# Patient Record
Sex: FEMALE | Race: WHITE | Employment: PART TIME | URBAN - METROPOLITAN AREA
[De-identification: names, ages, dates, MRNs, and addresses within clinical notes are randomized per-mention and may not be internally consistent; named-entity substitution may affect disease eponyms.]

---

## 2023-02-11 ENCOUNTER — HOSPITAL ENCOUNTER (EMERGENCY)
Age: 43
Discharge: HOME | End: 2023-02-11
Payer: COMMERCIAL

## 2023-02-11 VITALS
DIASTOLIC BLOOD PRESSURE: 87 MMHG | TEMPERATURE: 98.2 F | OXYGEN SATURATION: 98 % | SYSTOLIC BLOOD PRESSURE: 113 MMHG | RESPIRATION RATE: 18 BRPM | HEART RATE: 94 BPM

## 2023-02-11 DIAGNOSIS — S81.011A LACERATION OF RIGHT KNEE, INITIAL ENCOUNTER: Primary | ICD-10-CM

## 2023-02-11 PROCEDURE — 12002 RPR S/N/AX/GEN/TRNK2.6-7.5CM: CPT

## 2023-02-11 PROCEDURE — 99283 EMERGENCY DEPT VISIT LOW MDM: CPT

## 2023-02-11 RX ORDER — CEPHALEXIN 500 MG/1
500 CAPSULE ORAL 3 TIMES DAILY
Qty: 15 CAPSULE | Refills: 0 | Status: SHIPPED | OUTPATIENT
Start: 2023-02-11 | End: 2023-02-16

## 2023-02-11 ASSESSMENT — PAIN SCALES - GENERAL: PAINLEVEL_OUTOF10: 8

## 2023-02-11 ASSESSMENT — PAIN - FUNCTIONAL ASSESSMENT: PAIN_FUNCTIONAL_ASSESSMENT: 0-10

## 2023-02-11 ASSESSMENT — PAIN DESCRIPTION - ORIENTATION: ORIENTATION: RIGHT

## 2023-02-11 ASSESSMENT — PAIN DESCRIPTION - LOCATION: LOCATION: KNEE

## 2023-02-11 NOTE — ED PROVIDER NOTES
[address] [facility]  ED  EMERGENCY DEPARTMENT ENCOUNTER        Pt Name: Madonna Donaldson  MRN: [id number]  [place] 1980  Date of evaluation: 2/11/2023  Provider: Shell Carvajal PA-C  PCP: Oleg Lucas MD  Note Started: 4:12 PM EST 2/11/23      VIVIANE. I have evaluated this patient. My supervising physician was available for consultation. CHIEF COMPLAINT       Chief Complaint   Patient presents with    Laceration     Pt was running and fell, laceration to right knee bleeding controlled. HISTORY OF PRESENT ILLNESS: 1 or more Elements     History From: Patient and     Limitations to history : None    Madonna Donaldson is a 36 y.o. female who presents to the emergency department for repair of laceration anterior aspect over the superior patella area right knee. Patient states about 3 PM this afternoon wearing gym shoes attempting to sprint against daughter on the driveway. Patient lost footing and fell onto the concrete causing the injury to the anterior aspect of the right knee. Is a transverse laceration which currently measures 3.5 cm. Tetanus shot October, 2022. Nursing Notes were all reviewed and agreed with or any disagreements were addressed in the HPI. REVIEW OF SYSTEMS :      Review of Systems    Positives and Pertinent negatives as per HPI. SURGICAL HISTORY   No past surgical history on file. CURRENTMEDICATIONS       Previous Medications    No medications on file       ALLERGIES     Patient has no known allergies. FAMILYHISTORY     No family history on file.      SOCIAL HISTORY          SCREENINGS        [place] Coma Scale  Eye Opening: Spontaneous  Best Verbal Response: Oriented  Best Motor Response: Obeys commands  [place] Coma Scale Score: 15                CIWA Assessment  BP: 113/87  Heart Rate: 94           PHYSICAL EXAM  1 or more Elements     ED Triage Vitals [02/11/23 1557]   BP Temp Temp Source Heart Rate Resp SpO2 Height Weight 113/87 98.2 °F (36.8 °C) Oral 94 18 98 % -- --       Physical Exam  Vitals and nursing note reviewed. Constitutional:       Appearance: Normal appearance. She is well-developed and normal weight. HENT:      Head: Normocephalic and atraumatic. Right Ear: External ear normal.      Left Ear: External ear normal.   Eyes:      General: No scleral icterus. Right eye: No discharge. Left eye: No discharge. Conjunctiva/sclera: Conjunctivae normal.   Cardiovascular:      Rate and Rhythm: Normal rate. Pulmonary:      Effort: Pulmonary effort is normal.   Musculoskeletal:         General: Tenderness and signs of injury present. Normal range of motion. Cervical back: Normal range of motion and neck supple. Skin:     General: Skin is warm and dry. Neurological:      General: No focal deficit present. Mental Status: She is alert and oriented to person, place, and time. Mental status is at baseline. Psychiatric:         Mood and Affect: Mood normal.         Behavior: Behavior normal.         Thought Content: Thought content normal.         Judgment: Judgment normal.                 DIAGNOSTIC RESULTS   LABS:    Labs Reviewed - No data to display    When ordered only abnormal lab results are displayed. All other labs were within normal range or not returned as of this dictation. EKG: When ordered, EKG's are interpreted by the Emergency Department Physician in the absence of a cardiologist.  Please see their note for interpretation of EKG. RADIOLOGY:   Non-plain film images such as CT, Ultrasound and MRI are read by the radiologist. Plain radiographic images are visualized and preliminarily interpreted by the ED Provider with the below findings:      Interpretation per the Radiologist below, if available at the time of this note:    No orders to display     No results found. No results found.     PROCEDURES     I did use 1% lidocaine with epinephrine to anesthetize the skin surrounding the laceration. Once anesthesia was noted it was draped in sterile fashion cleansed with chlorhexidine and rinsed and irrigated with saline. Exploration reveals no foreign material or deep structures involved. I did use 2 4-0 Vicryl sutures to close the deep space. I then used 4-0 nylon to close the skin. Patient pleased with the results. Vaseline gauze dressing, dry sterile dressing and an Ace wrap applied over the area. Linear length is 3.5 cm. Procedures    CRITICAL CARE TIME (.cctime)       PAST MEDICAL HISTORY      has no past medical history on file. EMERGENCY DEPARTMENT COURSE and DIFFERENTIAL DIAGNOSIS/MDM:   Vitals:    Vitals:    02/11/23 1557   BP: 113/87   Pulse: 94   Resp: 18   Temp: 98.2 °F (36.8 °C)   TempSrc: Oral   SpO2: 98%       Patient was given the following medications:  Medications - No data to display          Is this patient to be included in the SEP-1 Core Measure due to severe sepsis or septic shock? No   Exclusion criteria - the patient is NOT to be included for SEP-1 Core Measure due to: No signs of sepsis. Chronic Conditions affecting care: None   has no past medical history on file. CONSULTS: (Who and What was discussed)  None    Social Determinants Significantly Affecting Health : None    Records Reviewed (External and Source) none    CC/HPI Summary, DDx, ED Course, and Reassessment: Patient presenting with a fall on driveway concrete while running. Pants did not tear. She sustained a transverse laceration 3.5 cm anterior aspect of the right knee. Primary closure today. Tetanus shot is up-to-date. I will start patient on antibiotic Keflex 500 mg 3 times daily for the next 5 days. Discussed wound care. Suture removal in 14 days by healthcare provider. I did place Vicryl stitches and she is aware that they will dissolve over the next several weeks to month. Recommending ice application for a few days.   Recommending ibuprofen 600 mg every 6 or 8 hours and Tylenol 1000 mg every 6 or 8 hours. Patient and  both expressed understanding of the diagnosis and the treatment plan. Disposition Considerations (tests considered but not done, Admit vs D/C, Shared Decision Making, Pt Expectation of Test or Tx.): Patient be discharged home with closure of eye laceration while running and following a dry weight. The pants did not tear. I did not see any for material removed. Primary closure using combination of Vicryl and nylon. Dressing applied. Ace wrap applied to prevent full flexion. I cautioned her on full flexion and straining the area for at least the next 2 weeks. I did put the patient on antibiotic for infection prophylaxis. I am the Primary Clinician of Record. FINAL IMPRESSION      1. Laceration of right knee, initial encounter          DISPOSITION/PLAN     DISPOSITION Decision To Discharge 02/11/2023 04:42:40 PM      PATIENT REFERRED TO:  Tammy Falcon MD  [facility] [address] [facility]  [phone]    Schedule an appointment as soon as possible for a visit in 15 days  For suture removal    [facility]  ED  [address]  Go to   If symptoms worsen    DISCHARGE MEDICATIONS:  New Prescriptions    CEPHALEXIN (KEFLEX) 500 MG CAPSULE    Take 1 capsule by mouth 3 times daily for 5 days       DISCONTINUED MEDICATIONS:  Discontinued Medications    No medications on file              (Please note that portions of this note were completed with a voice recognition program.  Efforts were made to edit the dictations but occasionally words are mis-transcribed. )    Antonia Rankin PA-C (electronically signed)        Antonia Rankin PA-C  02/11/23 [id number]

## 2023-02-11 NOTE — DISCHARGE INSTRUCTIONS
I did place two 4-0 Vicryl stitches deep to hold the deep space together. I then placed 4-0 nylon to close the skin. I recommend suture removal in 14 days. The Vicryl will dissolve in time. I do recommend ibuprofen 6 oh milligrams 3 times daily and you may add Tylenol 1000 mg 3 times daily for additional pain control. Apply ice for the first 48 hours as needed. I do recommend dressing change after shower or daily. I do recommend a very thin film of bacitracin-antibiotic ointment, applied to the area and then a dressing or large Band-Aid. Minimize flexing the knee for about 1 week or longer.

## 2023-02-11 NOTE — ED NOTES
Applied Vaseline gauze, sterile gauze, and ace wrap per PA's instructions.      Julia Lopez  02/11/23 [id number]

## 2023-02-11 NOTE — ED NOTES
Pt discharged in stable condition. Pt had no further questions at this time.       Wayne Angleo RN  02/11/23 5544